# Patient Record
Sex: MALE | Race: WHITE | Employment: UNEMPLOYED | URBAN - METROPOLITAN AREA
[De-identification: names, ages, dates, MRNs, and addresses within clinical notes are randomized per-mention and may not be internally consistent; named-entity substitution may affect disease eponyms.]

---

## 2018-04-15 ENCOUNTER — HOSPITAL ENCOUNTER (EMERGENCY)
Age: 28
Discharge: HOME | End: 2018-04-15
Attending: EMERGENCY MEDICINE
Payer: COMMERCIAL

## 2018-04-15 ENCOUNTER — APPOINTMENT (OUTPATIENT)
Dept: GENERAL RADIOLOGY | Age: 28
End: 2018-04-15
Attending: NURSE PRACTITIONER
Payer: COMMERCIAL

## 2018-04-15 VITALS
RESPIRATION RATE: 16 BRPM | SYSTOLIC BLOOD PRESSURE: 116 MMHG | DIASTOLIC BLOOD PRESSURE: 80 MMHG | HEART RATE: 70 BPM | BODY MASS INDEX: 23.1 KG/M2 | HEIGHT: 71 IN | TEMPERATURE: 97.7 F | WEIGHT: 165 LBS | OXYGEN SATURATION: 97 %

## 2018-04-15 DIAGNOSIS — S93.409A SPRAIN OF ANKLE, UNSPECIFIED LATERALITY, UNSPECIFIED LIGAMENT, INITIAL ENCOUNTER: Primary | ICD-10-CM

## 2018-04-15 PROCEDURE — 73610 X-RAY EXAM OF ANKLE: CPT

## 2018-04-15 PROCEDURE — 99283 EMERGENCY DEPT VISIT LOW MDM: CPT

## 2018-04-15 RX ORDER — NAPROXEN 500 MG/1
500 TABLET ORAL 2 TIMES DAILY WITH MEALS
Qty: 20 TAB | Refills: 0 | Status: SHIPPED | OUTPATIENT
Start: 2018-04-15 | End: 2018-04-25

## 2018-04-15 NOTE — DISCHARGE INSTRUCTIONS
Ankle Sprain: Care Instructions  Your Care Instructions    An ankle sprain can happen when you twist your ankle. The ligaments that support the ankle can get stretched and torn. Often the ankle is swollen and painful. Ankle sprains may take from several weeks to several months to heal. Usually, the more pain and swelling you have, the more severe your ankle sprain is and the longer it will take to heal. You can heal faster and regain strength in your ankle with good home treatment. It is very important to give your ankle time to heal completely, so that you do not easily hurt your ankle again. Follow-up care is a key part of your treatment and safety. Be sure to make and go to all appointments, and call your doctor if you are having problems. It's also a good idea to know your test results and keep a list of the medicines you take. How can you care for yourself at home? · Prop up your foot on pillows as much as possible for the next 3 days. Try to keep your ankle above the level of your heart. This will help reduce the swelling. · Follow your doctor's directions for wearing a splint or elastic bandage. Wrapping the ankle may help reduce or prevent swelling. · Your doctor may give you a splint, a brace, an air stirrup, or another form of ankle support to protect your ankle until it is healed. Wear it as directed while your ankle is healing. Do not remove it unless your doctor tells you to. After your ankle has healed, ask your doctor whether you should wear the brace when you exercise. · Put ice or cold packs on your injured ankle for 10 to 20 minutes at a time. Try to do this every 1 to 2 hours for the next 3 days (when you are awake) or until the swelling goes down. Put a thin cloth between the ice and your skin. · You may need to use crutches until you can walk without pain. If you do use crutches, try to bear some weight on your injured ankle if you can do so without pain.  This helps the ankle heal.  · Take pain medicines exactly as directed. ¨ If the doctor gave you a prescription medicine for pain, take it as prescribed. ¨ If you are not taking a prescription pain medicine, ask your doctor if you can take an over-the-counter medicine. · If you have been given ankle exercises to do at home, do them exactly as instructed. These can promote healing and help prevent lasting weakness. When should you call for help? Call your doctor now or seek immediate medical care if:  ? · Your pain is getting worse. ? · Your swelling is getting worse. ? · Your splint feels too tight or you are unable to loosen it. ? Watch closely for changes in your health, and be sure to contact your doctor if:  ? · You are not getting better after 1 week. Where can you learn more? Go to http://dulce-carmine.info/. Enter R801 in the search box to learn more about \"Ankle Sprain: Care Instructions. \"  Current as of: March 21, 2017  Content Version: 11.4  © 7734-3315 [employer]. Care instructions adapted under license by [employer] (which disclaims liability or warranty for this information). If you have questions about a medical condition or this instruction, always ask your healthcare professional. [address] any warranty or liability for your use of this information. We hope that we have addressed all of your medical concerns. The examination and treatment you received in the Emergency Department were for an emergent problem and were not intended as complete care. It is important that you follow up with your healthcare provider(s) for ongoing care. If your symptoms worsen or do not improve as expected, and you are unable to reach your usual health care provider(s), you should return to the Emergency Department.       Today's healthcare is undergoing tremendous change, and patient satisfaction surveys are one of the many tools to assess the quality of medical care. You may receive a survey from the [facility] regarding your experience in the Emergency Department. I hope that your experience has been completely positive, particularly the medical care that I provided. As such, please participate in the survey; anything less than excellent does not meet my expectations or intentions. [facility] and [employer] participate in nationally recognized quality of care measures. If your blood pressure is greater than 120/80, as reported below, we urge that you seek medical care to address the potential of high blood pressure, commonly known as hypertension. Hypertension can be hereditary or can be caused by certain medical conditions, pain, stress, or \"white coat syndrome. \"       Please make an appointment with your health care provider(s) for follow up of your Emergency Department visit. VITALS:   Patient Vitals for the past 8 hrs:   Temp Pulse Resp BP SpO2   04/15/18 1445 97.7 °F (36.5 °C) 70 16 116/80 97 %          Thank you for allowing us to provide you with medical care today. We realize that you have many choices for your emergency care needs. Please choose us in the future for any continued health care needs. Galen Barreto, [facility]: [phone]            No results found for this or any previous visit (from the past 24 hour(s)). Xr Ankle Lt Min 3 V    Result Date: 4/15/2018  EXAM:  XR ANKLE LT MIN 3 V INDICATION: Left ankle pain after injury COMPARISON: None. FINDINGS: Three views of the left ankle demonstrate no fracture or disruption of the ankle mortise. There is no other acute osseous or articular abnormality. There is moderate lateral soft tissue swelling and a moderate left ankle effusion.      IMPRESSION: Acute left lateral ankle sprain

## 2018-04-15 NOTE — ED PROVIDER NOTES
HPI Comments: 24 y.o. male with no significant past medical history who presents from home with chief complaint of left ankle pain. Pt reports an acute onset of left ankle pain while playing basketball this afternoon. Pt states that while playing he \"rolled\" his left ankle. The pain has been constant since and severe 8/10. He has pain with ambulation. Injury occurred approximately 45 minutes ago. No alleviating factors noted. No other injuries. There are no other acute medical concerns at this time. PCP: Zoraida Sarah MD    Note written by Vivienne Apodaca, as dictated by Tre Phipps PA-C 5:01 PM    The history is provided by the patient. No  was used. Past Medical History:   Diagnosis Date    Ill-defined condition     restless leg syndrome    Other ill-defined conditions(799.89)     seasonal allergies       Past Surgical History:   Procedure Laterality Date    HX HEENT      wisdom teeth extraction, nose surgery         Family History:   Problem Relation Age of Onset    Hypertension Father     Cancer Paternal Grandmother      lung       Social History     Social History    Marital status: SINGLE     Spouse name: N/A    Number of children: N/A    Years of education: N/A     Occupational History    Not on file. Social History Main Topics    Smoking status: Current Some Day Smoker     Packs/day: 0.50    Smokeless tobacco: Current User    Alcohol use No      Comment: H/O ETOH abuse    Drug use: No    Sexual activity: Not on file     Other Topics Concern    Not on file     Social History Narrative         ALLERGIES: Other medication    Review of Systems   Constitutional: Negative for chills and fever. HENT: Negative for ear pain and sore throat. Eyes: Negative for pain. Respiratory: Negative for chest tightness and shortness of breath. Cardiovascular: Negative for chest pain and leg swelling.    Gastrointestinal: Negative for abdominal pain, nausea and vomiting. Genitourinary: Negative for dysuria and flank pain. Musculoskeletal: Positive for arthralgias (left ankle) and gait problem. Negative for back pain. Skin: Negative for rash. Neurological: Negative for headaches. All other systems reviewed and are negative. Vitals:    04/15/18 1445   BP: 116/80   Pulse: 70   Resp: 16   Temp: 97.7 °F (36.5 °C)   SpO2: 97%   Weight: 74.8 kg (165 lb)   Height: 5' 11\" (1.803 m)            Physical Exam   Constitutional: He appears well-developed and well-nourished. No distress. HENT:   Head: Normocephalic and atraumatic. Eyes: Pupils are equal, round, and reactive to light. No scleral icterus. Neck: Normal range of motion. Neck supple. No tracheal deviation present. Cardiovascular: Normal rate, regular rhythm, normal heart sounds and intact distal pulses. Exam reveals no gallop and no friction rub. No murmur heard. Pulmonary/Chest: Effort normal and breath sounds normal. No respiratory distress. He has no wheezes. He has no rales. Musculoskeletal: He exhibits edema. Mild edema and tenderness over the lateral malleolus of the left ankle. Pt is NVI. Neurological: He is alert. Skin: Skin is warm and dry. Psychiatric: He has a normal mood and affect. Nursing note and vitals reviewed. MDM  Number of Diagnoses or Management Options  Sprain of ankle, unspecified laterality, unspecified ligament, initial encounter:   Diagnosis management comments: Imaging and exam consistent with ankle sprain. Advised RICE. Provided ACE wrap and crutches and advised close follow up with family doctor.   Ayad Crump PA-C        ED Course       Procedures